# Patient Record
Sex: FEMALE | Race: ASIAN | NOT HISPANIC OR LATINO | Employment: OTHER | URBAN - METROPOLITAN AREA
[De-identification: names, ages, dates, MRNs, and addresses within clinical notes are randomized per-mention and may not be internally consistent; named-entity substitution may affect disease eponyms.]

---

## 2021-01-15 DIAGNOSIS — Z23 NEED FOR VACCINATION: ICD-10-CM

## 2023-03-14 ENCOUNTER — OFFICE VISIT (OUTPATIENT)
Dept: MEDICAL GROUP | Facility: MEDICAL CENTER | Age: 78
End: 2023-03-14
Payer: MEDICARE

## 2023-03-14 VITALS
HEART RATE: 100 BPM | SYSTOLIC BLOOD PRESSURE: 180 MMHG | HEIGHT: 62 IN | DIASTOLIC BLOOD PRESSURE: 80 MMHG | OXYGEN SATURATION: 96 % | WEIGHT: 113 LBS | BODY MASS INDEX: 20.8 KG/M2 | TEMPERATURE: 97.1 F | RESPIRATION RATE: 16 BRPM

## 2023-03-14 DIAGNOSIS — I10 PRIMARY HYPERTENSION: ICD-10-CM

## 2023-03-14 DIAGNOSIS — Z00.00 WELLNESS EXAMINATION: ICD-10-CM

## 2023-03-14 DIAGNOSIS — Z12.11 COLON CANCER SCREENING: ICD-10-CM

## 2023-03-14 PROCEDURE — 99204 OFFICE O/P NEW MOD 45 MIN: CPT | Performed by: STUDENT IN AN ORGANIZED HEALTH CARE EDUCATION/TRAINING PROGRAM

## 2023-03-14 RX ORDER — LISINOPRIL 20 MG/1
20 TABLET ORAL DAILY
Qty: 30 TABLET | Refills: 11 | Status: SHIPPED | OUTPATIENT
Start: 2023-03-14

## 2023-03-14 ASSESSMENT — PATIENT HEALTH QUESTIONNAIRE - PHQ9: CLINICAL INTERPRETATION OF PHQ2 SCORE: 0

## 2023-03-14 NOTE — PROGRESS NOTES
"Subjective:     Chief Complaint   Patient presents with    Establish Care         HPI:   Lyssa presents today to establish care.  Patient was previously living in [place], recently moved to [place] with her daughter.  Patient notes that she has not seen a doctor in several years.    High blood pressure  Presents today with hypertension, 180/80.  Patient's daughter notes that she took mother's blood pressure at home and recognized it was high.  Patient notes a flushed feeling when she gets anxious or stressed but denies headaches vision changes.    High cholesterol  Patient notes a possible history of high cholesterol. Patient does note one time she took a medication that made her muscles feel tight, possibly cholesterol medication.  We will get labs to further evaluate high cholesterol.    Patient declines vaccines today.  Patient has not had any preventative care, no mammograms or colonoscopies.  Will order Cologuard.  Patient does take a with daily multivitamin.      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  Discussed osteoarthritis.  Patient notes pain relief with symptomatic treatment.      ROS:  Gen: no fevers/chills  Pulm: no sob, no cough  CV: no chest pain, no palpitations  GI: no nausea/vomiting, no diarrhea      Objective:     Exam:  BP (!) 180/80 (BP Location: Right arm, Patient Position: Sitting, BP Cuff Size: Adult)   Pulse 100   Temp 36.2 °C (97.1 °F) (Temporal)   Resp 16   Ht 1.575 m (5' 2\")   Wt 51.3 kg (113 lb)   SpO2 96%   BMI 20.67 kg/m²  Body mass index is 20.67 kg/m².    Gen: Alert and oriented, No apparent distress.  Neck: Neck is supple without lymphadenopathy.  Lungs: Normal effort, CTA bilaterally, no wheezes, rhonchi, or rales  CV: Regular rate and rhythm. No murmurs, rubs, or gallops.  Ext: No clubbing, cyanosis, edema.  Assessment & Plan:     77 y.o. female with the following -     1. Wellness examination  Concern for multiple uncontrolled conditions.  - Comp Metabolic " Panel; Future  - Lipid Profile; Future  - HEMOGLOBIN A1C; Future  - TSH WITH REFLEX TO FT4; Future    2. Primary hypertension  Uncontrolled.  Patient presents today with uncontrolled blood pressure 180/80.  We will start patient on lisinopril 20 mg.  We will continue to monitor blood pressure, daughter advised to monitor blood pressure at home follow-up in 1 month with log.  - lisinopril (PRINIVIL) 20 MG Tab; Take 1 Tablet by mouth every day.  Dispense: 30 Tablet; Refill: 11    3. Colon cancer screening  - COLOGUARD (FIT DNA)         Return in about 4 weeks (around 4/11/2023).    Please note that this dictation was created using voice recognition software. I have made every reasonable attempt to correct obvious errors, but I expect that there are errors of grammar and possibly content that I did not discover before finalizing the note.

## 2024-01-12 ENCOUNTER — DOCUMENTATION (OUTPATIENT)
Dept: HEALTH INFORMATION MANAGEMENT | Facility: OTHER | Age: 79
End: 2024-01-12
Payer: MEDICARE